# Patient Record
(demographics unavailable — no encounter records)

---

## 2024-10-11 NOTE — HISTORY OF PRESENT ILLNESS
[de-identified] : S/p left total hip replacement 08/06/2024 [de-identified] : Constitutional: Well nourished, no distress. No neurovascular symptoms.  o Left Exam: Incision healed, clean dry, and in tact. Fluctuant subcutaneous mass just below groin crease. Satisfactory gait. Leg lengths equal.  ROM: rotation provokes mild operative site pain. Ligaments intact. Motor Strength: Peroneals, EHL, and tibialis anterior 5/5, Reflexes: Patella 1+ R=L, Achilles 1+ R=L Quadriceps right (5/5) Left 5-/+ / 5, Left hip active flexion provokes pain [de-identified] : ANTONELLA RIOS is a 79 year male presents for evaluation s/p left hip total replacement Significant improvement in preoperative pain. Denies fever, chills. He is experiencing some groin and thigh pain. The patient is currently in a rehab facility. The patient does describe some residual numbness at operative site. Slight soreness near incision site when ambulating. Ambulating with a cane at this time.  Denies any fever or chills. Since his last visit, he continues to experience swelling of the left lower extremity. He reports 2 weeks ago he developed a lump over the medial proximal thigh just below groin crease with standing. Patient obtained a Doppler of the LLE on 09/06/2024 that was negative for DVT.  Tday, patient presents with MRI results for review.  Denies any back pain. symptoms have been worsening over the last 2 weeks.  The patient describes the pain as a constant, "pulling" sensation that is worse with walking, getting up from a seated position, certain movements, such as when bending over to tie his shoe. He states the pain can be a 6/10. Patient utilizes a cane occasionally for balance. He has no pain when lying down in bed, or when driving. Patient states he has not taken any pain medications. He used to take oxycodone prescribed by pain management, however, patient would like to avoid taking any pain medications.  [de-identified] : EXAM: [id number] - MR HIP LT  - ORDERED BY: RAUL ALLISON   PROCEDURE DATE:  09/18/2024    INTERPRETATION:  MR HIP LEFT dated 9/18/2024 8:27 PM  INDICATION: Left hip pain. Prior left hip arthroplasty. Inguinal mass  COMPARISON: Prostate MRI dated 4/17/2024  TECHNIQUE: Multi-sequential, multiplanar MRI imaging of the left hip was performed per standard protocol. Metal artifact reduction techniques were applied.  FINDINGS:  BONE MARROW: Patient is noted be status post left hip arthroplasty. Moderate susceptibility artifact limits evaluation the osseous detail around the hip arthroplasty. There is T2 hyperintense T1 hypointense signal at the anterior bone/metal interface of the acetabulum at the superior left pubic acetabular junction (series 6, image 10). The bone/metal interface the femoral component is unremarkable. No periprosthetic fracture is seen. SYNOVIUM/ JOINT FLUID: There is moderate fluid within the hip joint decompressing along the anterior capsular defect into the left greater trochanteric bursa anteriorly. Small bursal fluid noted over the left ischium. TENDONS: The tendons are intact. MUSCLES: Mild to moderate atrophy of the gluteus minimus muscle. Mild gluteus medius muscle atrophy. No muscle hematoma. NEUROVASCULAR STRUCTURES: Preserved SUBCUTANEOUS SOFT TISSUES: A skin marker is noted over the anterior medial thigh proximally denoting an enlarged lymph node that measures up to 1.3 cm with adjacent edema. An additional lymph node is noted along the pelvic wall tracking along the left inguinal chain measuring up to 1.1 cm (series 5, image 17). These are increased in size from prior study. INTRAPELVIC SOFT TISSUES: Small to moderate left fat-containing inguinal hernia. Enlarged prostate.  IMPRESSION:  1.  Status post left hip arthroplasty with mild edema at the bone/metal interface of the anterior acetabulum which may reflect persistent/resolving stress reaction as on prior imaging, there was stress reaction about the left hip prior to arthroplasty. A less likely differential consideration includes early osteolysis. Continued follow-up is recommended. 2.  Moderate fluid about the hip decompressing into the left greater trochanteric bursa. 3.  Left inguinal and pelvic adenopathy is nonspecific. Increased in size from prior exam. Corresponds overlying skin marker. Differentials include inflammatory, reactive, or neoplastic process. If further evaluation is warranted, histological sampling is recommended. 4.  Enlarged prostate. Correlate clinically and with recent prostate MRI.  --- End of Report ---       GLORIA MCCORMICK MD; Attending Radiologist This document has been electronically signed. Sep 19 2024 10:06AM  X-rays of the AP pelvis and AP, lateral of the left hip obtained 09/11/2024 demonstrates total hip replacement components in good alignment, femoral heads well centered in acetabulum, with no interval change. ____________________ EXAM: [id number] - US DPLX LWR EXT VEINS LTD LT  - ORDERED BY: RAUL ALLISON   PROCEDURE DATE:  09/06/2024    INTERPRETATION:  CLINICAL INFORMATION:  COMPARISON: None available.  TECHNIQUE: Duplex sonography of the LEFT LOWER extremity veins with color and spectral Doppler, with and without compression.  FINDINGS:  There is normal compressibility of the left common femoral, femoral and popliteal veins. The contralateral common femoral vein is patent. Doppler examination shows normal spontaneous and phasic flow.  No calf vein thrombosis is detected. 3.4 x 0.4 x 0.6 cm left popliteal cyst.  IMPRESSION: No evidence of left lower extremity deep venous thrombosis.  Small popliteal cyst.     --- End of Report ---       CAROLA AGARWAL MD; Attending Radiologist This document has been electronically signed. Sep  6 2024  3:36PM  X-rays of the AP pelvis and AP, lateral of the left hip obtained 08/21/2024 demonstrates total hip replacement components in good alignment, femoral heads well centered in acetabulum. [de-identified] : S/p left total hip replacement 08/06/2024 with tensor fascia rectus strain [de-identified] : MRI of the Left hip was reviewed with the patient today. Patient with Status post left hip arthroplasty with mild edema at the bone/metal interface of the anterior acetabulum which may reflect persistent/resolving stress reaction as on prior imaging, there was stress reaction about the left hip prior to arthroplasty. A less likely differential consideration includes early osteolysis. Continued follow-up is recommended. Moderate fluid about the hip decompressing into the left greater trochanteric bursa.  Left inguinal and pelvic adenopathy is nonspecific. Increased in size from prior exam. Corresponds overlying skin marker. Differentials include inflammatory, reactive, or neoplastic process. If further evaluation is warranted, histological sampling is recommended. Enlarged prostate. Correlate clinically and with recent prostate MRI.  They have been instructed on exercises to avoid, including running, jumping, squatting, lunging.  Discontinue with PT at this time.  Follow up 1 month

## 2024-10-11 NOTE — HISTORY OF PRESENT ILLNESS
[de-identified] : S/p left total hip replacement 08/06/2024 [de-identified] : Constitutional: Well nourished, no distress. No neurovascular symptoms.  o Left Exam: Incision healed, clean dry, and in tact. Fluctuant subcutaneous mass just below groin crease. Satisfactory gait. Leg lengths equal.  ROM: rotation provokes mild operative site pain. Ligaments intact. Motor Strength: Peroneals, EHL, and tibialis anterior 5/5, Reflexes: Patella 1+ R=L, Achilles 1+ R=L Quadriceps right (5/5) Left 5-/+ / 5, Left hip active flexion provokes pain [de-identified] : ANTONELLA RIOS is a 79 year male presents for evaluation s/p left hip total replacement Significant improvement in preoperative pain. Denies fever, chills. He is experiencing some groin and thigh pain. The patient is currently in a rehab facility. The patient does describe some residual numbness at operative site. Slight soreness near incision site when ambulating. Ambulating with a cane at this time.  Denies any fever or chills. Since his last visit, he continues to experience swelling of the left lower extremity. He reports 2 weeks ago he developed a lump over the medial proximal thigh just below groin crease with standing. Patient obtained a Doppler of the LLE on 09/06/2024 that was negative for DVT.  Tday, patient presents with MRI results for review.  Denies any back pain. symptoms have been worsening over the last 2 weeks.  The patient describes the pain as a constant, "pulling" sensation that is worse with walking, getting up from a seated position, certain movements, such as when bending over to tie his shoe. He states the pain can be a 6/10. Patient utilizes a cane occasionally for balance. He has no pain when lying down in bed, or when driving. Patient states he has not taken any pain medications. He used to take oxycodone prescribed by pain management, however, patient would like to avoid taking any pain medications.  [de-identified] : EXAM: [id number] - MR HIP LT  - ORDERED BY: RAUL ALLISON   PROCEDURE DATE:  09/18/2024    INTERPRETATION:  MR HIP LEFT dated 9/18/2024 8:27 PM  INDICATION: Left hip pain. Prior left hip arthroplasty. Inguinal mass  COMPARISON: Prostate MRI dated 4/17/2024  TECHNIQUE: Multi-sequential, multiplanar MRI imaging of the left hip was performed per standard protocol. Metal artifact reduction techniques were applied.  FINDINGS:  BONE MARROW: Patient is noted be status post left hip arthroplasty. Moderate susceptibility artifact limits evaluation the osseous detail around the hip arthroplasty. There is T2 hyperintense T1 hypointense signal at the anterior bone/metal interface of the acetabulum at the superior left pubic acetabular junction (series 6, image 10). The bone/metal interface the femoral component is unremarkable. No periprosthetic fracture is seen. SYNOVIUM/ JOINT FLUID: There is moderate fluid within the hip joint decompressing along the anterior capsular defect into the left greater trochanteric bursa anteriorly. Small bursal fluid noted over the left ischium. TENDONS: The tendons are intact. MUSCLES: Mild to moderate atrophy of the gluteus minimus muscle. Mild gluteus medius muscle atrophy. No muscle hematoma. NEUROVASCULAR STRUCTURES: Preserved SUBCUTANEOUS SOFT TISSUES: A skin marker is noted over the anterior medial thigh proximally denoting an enlarged lymph node that measures up to 1.3 cm with adjacent edema. An additional lymph node is noted along the pelvic wall tracking along the left inguinal chain measuring up to 1.1 cm (series 5, image 17). These are increased in size from prior study. INTRAPELVIC SOFT TISSUES: Small to moderate left fat-containing inguinal hernia. Enlarged prostate.  IMPRESSION:  1.  Status post left hip arthroplasty with mild edema at the bone/metal interface of the anterior acetabulum which may reflect persistent/resolving stress reaction as on prior imaging, there was stress reaction about the left hip prior to arthroplasty. A less likely differential consideration includes early osteolysis. Continued follow-up is recommended. 2.  Moderate fluid about the hip decompressing into the left greater trochanteric bursa. 3.  Left inguinal and pelvic adenopathy is nonspecific. Increased in size from prior exam. Corresponds overlying skin marker. Differentials include inflammatory, reactive, or neoplastic process. If further evaluation is warranted, histological sampling is recommended. 4.  Enlarged prostate. Correlate clinically and with recent prostate MRI.  --- End of Report ---       GLORIA MCCORMICK MD; Attending Radiologist This document has been electronically signed. Sep 19 2024 10:06AM  X-rays of the AP pelvis and AP, lateral of the left hip obtained 09/11/2024 demonstrates total hip replacement components in good alignment, femoral heads well centered in acetabulum, with no interval change. ____________________ EXAM: [id number] - US DPLX LWR EXT VEINS LTD LT  - ORDERED BY: RAUL ALLISON   PROCEDURE DATE:  09/06/2024    INTERPRETATION:  CLINICAL INFORMATION:  COMPARISON: None available.  TECHNIQUE: Duplex sonography of the LEFT LOWER extremity veins with color and spectral Doppler, with and without compression.  FINDINGS:  There is normal compressibility of the left common femoral, femoral and popliteal veins. The contralateral common femoral vein is patent. Doppler examination shows normal spontaneous and phasic flow.  No calf vein thrombosis is detected. 3.4 x 0.4 x 0.6 cm left popliteal cyst.  IMPRESSION: No evidence of left lower extremity deep venous thrombosis.  Small popliteal cyst.     --- End of Report ---       CAROLA AGARWAL MD; Attending Radiologist This document has been electronically signed. Sep  6 2024  3:36PM  X-rays of the AP pelvis and AP, lateral of the left hip obtained 08/21/2024 demonstrates total hip replacement components in good alignment, femoral heads well centered in acetabulum. [de-identified] : S/p left total hip replacement 08/06/2024 with tensor fascia rectus strain [de-identified] : MRI of the Left hip was reviewed with the patient today. Patient with Status post left hip arthroplasty with mild edema at the bone/metal interface of the anterior acetabulum which may reflect persistent/resolving stress reaction as on prior imaging, there was stress reaction about the left hip prior to arthroplasty. A less likely differential consideration includes early osteolysis. Continued follow-up is recommended. Moderate fluid about the hip decompressing into the left greater trochanteric bursa.  Left inguinal and pelvic adenopathy is nonspecific. Increased in size from prior exam. Corresponds overlying skin marker. Differentials include inflammatory, reactive, or neoplastic process. If further evaluation is warranted, histological sampling is recommended. Enlarged prostate. Correlate clinically and with recent prostate MRI.  They have been instructed on exercises to avoid, including running, jumping, squatting, lunging.  Discontinue with PT at this time.  Follow up 1 month

## 2024-10-11 NOTE — ADDENDUM
[FreeTextEntry1] : I, Karissa Means, acted solely as a scribe for Dr. Bryn Hartman MD on this date  10/11/2024  All medical record entries made by the Scribe were at my, Dr. Bryn Hartman MD , direction and personally dictated by me on 10/11/2024. I have reviewed the chart and agree that the record accurately reflects my personal performance of the history, physical exam, assessment and plan. I have also personally directed, reviewed, and agreed with the chart.

## 2025-02-12 NOTE — HISTORY OF PRESENT ILLNESS
[de-identified] : ANTONELLA RIOS is a 79 year old male  who presents for follow up evaluation of S/p left total hip replacement 08/06/2024. He developed a lump over the medial proximal thigh just below groin crease with standing. Patient obtained a Doppler of the LLE on 09/06/2024 that was negative for DVT.  Denies any back pain. symptoms have been worsening. The patient describes the pain as a constant, "pulling" sensation that is worse with walking, getting up from a seated position, certain movements, such as when bending over to tie his shoe. He states the pain can be a 6/10. Patient utilizes a cane occasionally for balance. He has no pain when lying down in bed, or when driving. Patient states he has not taken any pain medications. He used to take oxycodone prescribed by pain management, however, patient would like to avoid taking any pain medications. Since his last visit, he is supposed to have lymph nodes removed from his groin area. Reports continued constant lateral hip pain. Symptoms are alleviated at rest. He hasn't resumed PT. Denies any pain when lying on the affected side or with walking. Notes a "pulling" sensation when walking for prolonged periods. Reports pain with going up and down the stairs.  Denies any numbness or tingling of the lower extremities.

## 2025-02-12 NOTE — REASON FOR VISIT
[Follow-Up Visit] : a follow-up visit for [FreeTextEntry2] : S/p left total hip replacement 08/06/2024.

## 2025-02-12 NOTE — PHYSICAL EXAM
[de-identified] : Constitutional: Well nourished , well developed and in no acute distress Psychiatric: Alert and oriented to time place and person.Appropriate affect . Skin: Head, neck, arms and lower extremities:no lesions or discoloration HEENT: Normocephalic, EOM intact, Nasal septum midline, Respiratory: Unlabored respirations,no audible wheezing ,no tachypnea, no cyanosis Cardiovascular: No leg swelling no ankle edema no JVD, pulse regular Vascular: No calf or thigh tenderness, Peripheral pulses: intact Lymphatics: No groin adenopathy,no lymphedema lower or upper extremities   o Left Hip Exam: Inspection/Palpation : tenderness to palpation over the greater trochanter, no swelling, no deformity Leg Lengths: equal Passive Range of Motion: flexion 90 degrees, internal 20 degrees, external 40 degrees, abduction 30 degrees, adduction 25 degrees Strength: resisted hip abduction 5/5 provokes hip pain Skin : no erythema, no ecchymosis Sensation : sensation to light touch intact Vascular Exam : no edema, no cyanosis, dorsalis pedis artery pulse 2+, posterior tibial artery pulse 2+ [de-identified] :  EXAM: [id number] - MR HIP LT - ORDERED BY: RAUL ALLISON  PROCEDURE DATE: 09/18/2024  INTERPRETATION: MR HIP LEFT dated 9/18/2024 8:27 PM  INDICATION: Left hip pain. Prior left hip arthroplasty. Inguinal mass  COMPARISON: Prostate MRI dated 4/17/2024  TECHNIQUE: Multi-sequential, multiplanar MRI imaging of the left hip was performed per standard protocol. Metal artifact reduction techniques were applied.  FINDINGS:  BONE MARROW: Patient is noted be status post left hip arthroplasty. Moderate susceptibility artifact limits evaluation the osseous detail around the hip arthroplasty. There is T2 hyperintense T1 hypointense signal at the anterior bone/metal interface of the acetabulum at the superior left pubic acetabular junction (series 6, image 10). The bone/metal interface the femoral component is unremarkable. No periprosthetic fracture is seen. SYNOVIUM/ JOINT FLUID: There is moderate fluid within the hip joint decompressing along the anterior capsular defect into the left greater trochanteric bursa anteriorly. Small bursal fluid noted over the left ischium. TENDONS: The tendons are intact. MUSCLES: Mild to moderate atrophy of the gluteus minimus muscle. Mild gluteus medius muscle atrophy. No muscle hematoma. NEUROVASCULAR STRUCTURES: Preserved SUBCUTANEOUS SOFT TISSUES: A skin marker is noted over the anterior medial thigh proximally denoting an enlarged lymph node that measures up to 1.3 cm with adjacent edema. An additional lymph node is noted along the pelvic wall tracking along the left inguinal chain measuring up to 1.1 cm (series 5, image 17). These are increased in size from prior study. INTRAPELVIC SOFT TISSUES: Small to moderate left fat-containing inguinal hernia. Enlarged prostate.  IMPRESSION:  1. Status post left hip arthroplasty with mild edema at the bone/metal interface of the anterior acetabulum which may reflect persistent/resolving stress reaction as on prior imaging, there was stress reaction about the left hip prior to arthroplasty. A less likely differential consideration includes early osteolysis. Continued follow-up is recommended. 2. Moderate fluid about the hip decompressing into the left greater trochanteric bursa. 3. Left inguinal and pelvic adenopathy is nonspecific. Increased in size from prior exam. Corresponds overlying skin marker. Differentials include inflammatory, reactive, or neoplastic process. If further evaluation is warranted, histological sampling is recommended. 4. Enlarged prostate. Correlate clinically and with recent prostate MRI.  --- End of Report ---       GLORIA MCCORMICK MD; Attending Radiologist This document has been electronically signed. Sep 19 2024 10:06AM  X-rays of the AP pelvis and AP, lateral of the left hip obtained 09/11/2024 demonstrates total hip replacement components in good alignment, femoral heads well centered in acetabulum, with no interval change. ____________________ EXAM: [id number] - US DPLX LWR EXT VEINS LTD LT - ORDERED BY: RAUL ALLISON   PROCEDURE DATE: 09/06/2024    INTERPRETATION: CLINICAL INFORMATION:  COMPARISON: None available.  TECHNIQUE: Duplex sonography of the LEFT LOWER extremity veins with color and spectral Doppler, with and without compression.  FINDINGS:  There is normal compressibility of the left common femoral, femoral and popliteal veins. The contralateral common femoral vein is patent. Doppler examination shows normal spontaneous and phasic flow.  No calf vein thrombosis is detected. 3.4 x 0.4 x 0.6 cm left popliteal cyst.  IMPRESSION: No evidence of left lower extremity deep venous thrombosis.  Small popliteal cyst.     --- End of Report ---       CAROLA AGRAWAL MD; Attending Radiologist This document has been electronically signed. Sep 6 2024 3:36PM  X-rays of the AP pelvis and AP, lateral of the left hip obtained 08/21/2024 demonstrates total hip replacement components in good alignment, femoral heads well centered in acetabulum.

## 2025-02-12 NOTE — PHYSICAL EXAM
[de-identified] : Constitutional: Well nourished , well developed and in no acute distress Psychiatric: Alert and oriented to time place and person.Appropriate affect . Skin: Head, neck, arms and lower extremities:no lesions or discoloration HEENT: Normocephalic, EOM intact, Nasal septum midline, Respiratory: Unlabored respirations,no audible wheezing ,no tachypnea, no cyanosis Cardiovascular: No leg swelling no ankle edema no JVD, pulse regular Vascular: No calf or thigh tenderness, Peripheral pulses: intact Lymphatics: No groin adenopathy,no lymphedema lower or upper extremities   o Left Hip Exam: Inspection/Palpation : tenderness to palpation over the greater trochanter, no swelling, no deformity Leg Lengths: equal Passive Range of Motion: flexion 90 degrees, internal 20 degrees, external 40 degrees, abduction 30 degrees, adduction 25 degrees Strength: resisted hip abduction 5/5 provokes hip pain Skin : no erythema, no ecchymosis Sensation : sensation to light touch intact Vascular Exam : no edema, no cyanosis, dorsalis pedis artery pulse 2+, posterior tibial artery pulse 2+ [de-identified] :  EXAM: [id number] - MR HIP LT - ORDERED BY: RAUL ALLISON  PROCEDURE DATE: 09/18/2024  INTERPRETATION: MR HIP LEFT dated 9/18/2024 8:27 PM  INDICATION: Left hip pain. Prior left hip arthroplasty. Inguinal mass  COMPARISON: Prostate MRI dated 4/17/2024  TECHNIQUE: Multi-sequential, multiplanar MRI imaging of the left hip was performed per standard protocol. Metal artifact reduction techniques were applied.  FINDINGS:  BONE MARROW: Patient is noted be status post left hip arthroplasty. Moderate susceptibility artifact limits evaluation the osseous detail around the hip arthroplasty. There is T2 hyperintense T1 hypointense signal at the anterior bone/metal interface of the acetabulum at the superior left pubic acetabular junction (series 6, image 10). The bone/metal interface the femoral component is unremarkable. No periprosthetic fracture is seen. SYNOVIUM/ JOINT FLUID: There is moderate fluid within the hip joint decompressing along the anterior capsular defect into the left greater trochanteric bursa anteriorly. Small bursal fluid noted over the left ischium. TENDONS: The tendons are intact. MUSCLES: Mild to moderate atrophy of the gluteus minimus muscle. Mild gluteus medius muscle atrophy. No muscle hematoma. NEUROVASCULAR STRUCTURES: Preserved SUBCUTANEOUS SOFT TISSUES: A skin marker is noted over the anterior medial thigh proximally denoting an enlarged lymph node that measures up to 1.3 cm with adjacent edema. An additional lymph node is noted along the pelvic wall tracking along the left inguinal chain measuring up to 1.1 cm (series 5, image 17). These are increased in size from prior study. INTRAPELVIC SOFT TISSUES: Small to moderate left fat-containing inguinal hernia. Enlarged prostate.  IMPRESSION:  1. Status post left hip arthroplasty with mild edema at the bone/metal interface of the anterior acetabulum which may reflect persistent/resolving stress reaction as on prior imaging, there was stress reaction about the left hip prior to arthroplasty. A less likely differential consideration includes early osteolysis. Continued follow-up is recommended. 2. Moderate fluid about the hip decompressing into the left greater trochanteric bursa. 3. Left inguinal and pelvic adenopathy is nonspecific. Increased in size from prior exam. Corresponds overlying skin marker. Differentials include inflammatory, reactive, or neoplastic process. If further evaluation is warranted, histological sampling is recommended. 4. Enlarged prostate. Correlate clinically and with recent prostate MRI.  --- End of Report ---       GLORIA MCCORMICK MD; Attending Radiologist This document has been electronically signed. Sep 19 2024 10:06AM  X-rays of the AP pelvis and AP, lateral of the left hip obtained 09/11/2024 demonstrates total hip replacement components in good alignment, femoral heads well centered in acetabulum, with no interval change. ____________________ EXAM: [id number] - US DPLX LWR EXT VEINS LTD LT - ORDERED BY: RAUL ALLISON   PROCEDURE DATE: 09/06/2024    INTERPRETATION: CLINICAL INFORMATION:  COMPARISON: None available.  TECHNIQUE: Duplex sonography of the LEFT LOWER extremity veins with color and spectral Doppler, with and without compression.  FINDINGS:  There is normal compressibility of the left common femoral, femoral and popliteal veins. The contralateral common femoral vein is patent. Doppler examination shows normal spontaneous and phasic flow.  No calf vein thrombosis is detected. 3.4 x 0.4 x 0.6 cm left popliteal cyst.  IMPRESSION: No evidence of left lower extremity deep venous thrombosis.  Small popliteal cyst.     --- End of Report ---       CAROLA AGARWAL MD; Attending Radiologist This document has been electronically signed. Sep 6 2024 3:36PM  X-rays of the AP pelvis and AP, lateral of the left hip obtained 08/21/2024 demonstrates total hip replacement components in good alignment, femoral heads well centered in acetabulum.

## 2025-02-12 NOTE — ADDENDUM
[FreeTextEntry1] : I, Karissa Means, acted solely as a scribe for Dr. Bryn Hartman MD on this date  02/12/2025  All medical record entries made by the Scribe were at my, Dr. Bryn Hartman MD , direction and personally dictated by me on 02/12/2025. I have reviewed the chart and agree that the record accurately reflects my personal performance of the history, physical exam, assessment and plan. I have also personally directed, reviewed, and agreed with the chart.

## 2025-02-12 NOTE — DISCUSSION/SUMMARY
[de-identified] : 78 yo male S/p left total hip replacement 08/06/2024 with greater trochanter bursitis  MRI of the left hp was ordered for left tendonitis/ bursitis. Follow up when results are available and discuss further treatment plan.  The patient understood and verbally agreed to the treatment plan. All of their questions were answered. The patient should call the office if they have any questions or experience worsening symptoms.

## 2025-02-12 NOTE — HISTORY OF PRESENT ILLNESS
[de-identified] : ANTONELLA RIOS is a 79 year old male  who presents for follow up evaluation of S/p left total hip replacement 08/06/2024. He developed a lump over the medial proximal thigh just below groin crease with standing. Patient obtained a Doppler of the LLE on 09/06/2024 that was negative for DVT.  Denies any back pain. symptoms have been worsening. The patient describes the pain as a constant, "pulling" sensation that is worse with walking, getting up from a seated position, certain movements, such as when bending over to tie his shoe. He states the pain can be a 6/10. Patient utilizes a cane occasionally for balance. He has no pain when lying down in bed, or when driving. Patient states he has not taken any pain medications. He used to take oxycodone prescribed by pain management, however, patient would like to avoid taking any pain medications. Since his last visit, he is supposed to have lymph nodes removed from his groin area. Reports continued constant lateral hip pain. Symptoms are alleviated at rest. He hasn't resumed PT. Denies any pain when lying on the affected side or with walking. Notes a "pulling" sensation when walking for prolonged periods. Reports pain with going up and down the stairs.  Denies any numbness or tingling of the lower extremities.

## 2025-02-12 NOTE — DISCUSSION/SUMMARY
[de-identified] : 78 yo male S/p left total hip replacement 08/06/2024 with greater trochanter bursitis  MRI of the left hp was ordered for left tendonitis/ bursitis. Follow up when results are available and discuss further treatment plan.  The patient understood and verbally agreed to the treatment plan. All of their questions were answered. The patient should call the office if they have any questions or experience worsening symptoms.

## 2025-02-12 NOTE — ADDENDUM
[FreeTextEntry1] : I, Karissa Means, acted solely as a scribe for Dr. Bryn Hartman MD on this date  02/12/2025  All medical record entries made by the Scribe were at my, Dr. Bryn Hartmna MD , direction and personally dictated by me on 02/12/2025. I have reviewed the chart and agree that the record accurately reflects my personal performance of the history, physical exam, assessment and plan. I have also personally directed, reviewed, and agreed with the chart.

## 2025-06-03 NOTE — PHYSICAL EXAM
[de-identified] : He is fully alert and oriented with a normal mood and affect.  As I take the history he is reluctant to move his neck.  His lower extremity neurological examination revealed 1+ symmetrical knee jerks.  Ankle jerk was trace on the right and absent on the left.  Motor power is normal to manual testing in all lower extremity groups and sensation is normal.  Straight leg raising is negative to 90 degrees.  His upper extremity neurological examination revealed 1+ symmetrical reflexes.  Motor power is normal to manual testing in all upper extremity groups and sensation is normal to light touch in all dermatomes.  He has a negative Ravi's.  Range of motion of the cervical spine showed flexion with the chin reaching to within 1 fingerbreadth the chest.  Extension was just to 0 degrees with pain and rotation was markedly limited as was tilt by pain.  He is in atrial fibrillation as I examine him today but it is not fast. [de-identified] : I reviewed a prior CT scan of the cervical spine from 2015 that revealed marked multilevel degenerative changes.  I obtained new AP and lateral x-rays of the cervical spine with flexion-extension.  There is very little motion.  The degenerative changes are consistent with what he has had in the past.

## 2025-06-03 NOTE — DISCUSSION/SUMMARY
[Medication Risks Reviewed] : Medication risks reviewed [de-identified] : He will rest and use moist heat.  I have started him on a Medrol Dosepak.  If he is no better in 7 or 8 days he will call me and it can be repeated.  In light of his age I discouraged the use of the OxyContin and tramadol that he is taking as it increases the risk of falls.

## 2025-06-03 NOTE — HISTORY OF PRESENT ILLNESS
[de-identified] : This 79-year-old male has a long history of mixed symptoms with multiple radiographic studies in the past.  6 days ago he had the spontaneous onset of his recurrent neck pain.  He has been using heat without help.  He has been taking some OxyContin he has that he used before his hip surgery and some tramadol from his pain management doctor.  The neck pain is not radiating to his arms.  He is not having upper extremity neurologic symptoms of numbness, paresthesias or weakness.  There is no Valsalva or affect.  There have been no changes in his gait or balance and there are no problems with hand function.  He relates he is still having problems from the hip that was replaced last August.  He is on Xarelto for atrial fibrillation as well as amlodipine metoprolol and tamsulosin.  He is still working as a  for safety issues. [Pain Location] : pain [Worsening] : worsening [8] : a maximum pain level of 8/10